# Patient Record
(demographics unavailable — no encounter records)

---

## 2024-10-11 NOTE — HISTORY OF PRESENT ILLNESS
[FreeTextEntry1] : 10/11/24 [facility] US Att'g Consult Note: Ms Cam is a [id number] at 19w5d who is referred for anatomic survey.   Her past Gyn Hx is significant for uterine myomas which were present in her first pregnancy but did not bother her.  Today she has no complaints.  ROS is negative.  Px: The abdomen is soft and NT. The uterus is palpated, and is nontender, including the LLQ.    Today's ultrasound findings: 1.  AN INTRAUTERINE RIVAS GESTATION IS NOTED in variable lie with posterior position. . 2.  ESTIMATED FETAL WEIGHT IS APPROPRIATE FOR THE STATED GESTATIONAL AGE. 3.  ADEQUATE AMNIOTIC FLUID VOLUME. 4.  NO MAJOR FETAL MALFORMATIONS ARE NOTED ON THE VISUALIZED ANATOMY TODAY WITHIN THE LIMITATIONS OF ULTRASOUND. 5.  Two myomas are noted: the largest is 9cm and is in the anterior left SACHIN.  The other is 4cm and is in the posterior left SACHIN.  Neither appears to be obstructing the birth canal.  6.  TRANSVAGINAL ULTRASOUND WAS required IN ADDITION TO A TRANSABDOMINAL TO ADEQUATELY IMAGE THE SACHIN, MYOMAS AND CERVIX.  THE CERVICAL LENGTH WAS NORMAL.   Impression: 1. Normal anatomic survey.  Ms Cam understands that ULTRASOUND CANNOT DETECT ALL FETAL ANOMALIES AND THAT SOME ANOMALIES CAN DEVELOP OVER TIME. 2. Large SACHIN myomas, not obstructing the birth canal.  These myomas will likely enlarge and move out of the pelvis as the uterus grows. 3. We discussed the possibility of myoma necrosis due to rapidly growing tumors.  This can cause significant pain and uterine contractions.    Recommend: 1.  Ms Cam understands to come to hospital if she experiences moderate to severe uterine pain for evaluation and treatment.  2.  RT for growth US BETWEEN 32 - 34 WEEKS. 3.  Weekly fetal testing starting at 37w for maternal age 36 at delivery.   Thank you for allowing us to be part of Ms Cam' prenatal care team.   MD Stephan, FACOG

## 2024-10-14 NOTE — HISTORY OF PRESENT ILLNESS
[FreeTextEntry1] : REASON FOR VISIT: Ms. Luly Cam is a 35-year-old  female of [place] descent with a gestational age of 19 weeks and 5 days based on an ADILENE of 3/2/2025 who was seen for genetic counseling through [facility] on 10/11/2024 to review her comprehensive carrier screening. The patient was accompanied by her partner.    RELEVANT MEDICAL AND SURGICAL HISTORY: - Anemia (on iron)   PREGNANCY HISTORY: Conception: Natural Bleeding/Spotting: Denied  Illness/Infection: Denied  Fever: Denied  Medications: Denied  Alcohol: Denied  Drugs/Smoking: Denied  Other: Denied    COUNSELING NARRATIVE: #Carrier of Genetic Disorder The patient had comprehensive carrier screening done through [facility] for a 274 gene Horizon panel and the results were as follows:   Positive Carrier: 1. Silent carrier of Alpha Thalassemia (aa/-a)   The clinical manifestations and inheritance pattern of this disorder was reviewed/these disorders were reviewed.  This patient was found on universal carrier screening to be a silent carrier of alpha thalassemia.  This patient was informed that there are four alpha thalassemia genes and that the loss of one of these genes is referred to as a "silent carrier" since most people with this result have a normal MCV value.  We reviewed the clinical manifestations and autosomal recessive inheritance of alpha thalassemia.  If one gene is missing, a person is a silent carrier of alpha thalassemia and usually has no signs or symptoms. If two genes are missing, a person has alpha thalassemia trait (also called alpha thalassemia minor) and may have mild anemia. If three genes are missing, a person has hemoglobin H disease which can cause moderate to severe anemia. If all four genes are missing, a person has alpha thalassemia major (also called hemoglobin Barts or hydrops fetalis) which is the most severe type of alpha thalassemia generally causing fetal death. Testing this patient's /partner for alpha thalassemia was recommended to assess for the risk of hemoglobin H disease for their children.  The patient's  stated he was uninsured. We discussed the lab offers the test at a cost of $250 with financial discounts. We also discussed a CBC to assess the MCV value. While the MCV value is helpful for alpha thalassemia trait, it cannot always detect silent carriers or tell us if the variants are in cis or trans, however, a normal MCV would significantly lower the likelihood of having children with hemoglobin H disease. The patient's  stated he had his blood drawn several years ago through a primary care doctor and was told everything was normal. He said the clinic was only a few blocks away from his home. He was encouraged to go to the clinic to obtain his bloodwork and send it to us for our review.  The remaining genes were negative and reported no pathogenic variants detected. We reviewed the reason for carrier screening and the clinical significance. This patient was informed that based on the current test results, this couples chance to have a child with any of the remaining disorders tested are greatly reduced.   The patient was informed about the importance of sharing her carrier status with her family, as relatives confer a risk of being a carrier for this disorder.   PRENATAL TESTING: We discussed the correlation between maternal age and Down syndrome, and the phenotype and natural history of Down syndrome and other common aneuploidies were reviewed. Because she will be 35 at delivery, her age-related risk for a baby with Down syndrome is 1 in 353 and her risk for a baby with any chromosomal abnormality, including Down syndrome, is 1 in 178.   The patient previously had cell-free fetal DNA screening performed for this pregnancy which was low risk for aneuploidies for the chromosomes studied (21, 18, 13, X, Y).     The patient was given the option of diagnostic testing.  The risks, benefits, and limitations of each option were explained including the 1/400 risk of miscarriage or other complication with invasive testing. Diagnostic testing also allows for microarray analysis. Unlike chorionic villus sampling (CVS) and amniocentesis which are diagnostic procedures, cell-free fetal DNA screening is a screening test with very high sensitivity and specificity for trisomies 21, 18, and 13. It can also detect sex chromosome aneuploidy and several microdeletion syndromes but with lower sensitivity and specificity. If cell-free fetal DNA screening is positive, ultrasound and diagnostic testing are recommended for confirmation.  If cell-free fetal DNA screening is negative, it is not a guarantee of an unaffected child.  She also has the option not to test.   The patient declined diagnostic testing at this time.   FAMILY HISTORY: A 3 generation pedigree was obtained and will be scanned into the medical record. Of note, we discussed the following significant family history:   The family history was otherwise negative for known significant birth defects, intellectual disability, consanguinity and known genetic diseases.  There is a 3-5% background risk for any birth defect or developmental issue with every pregnancy.  SUMMARY AND PLAN: - We reviewed the patient's comprehensive carrier screening results. - No further testing was ordered today.   The patient expressed understanding and all of her questions were answered in detail. If you have any additional questions, please feel free to call me at [phone] or [phone]. Thank you for referring this patient.   Sincerely,   Sherri Andrade MS, [facility] Genetic Counselor

## 2024-10-14 NOTE — HISTORY OF PRESENT ILLNESS
[FreeTextEntry1] : REASON FOR VISIT: Ms. Luly Cam is a 35-year-old  female of [place] descent with a gestational age of 19 weeks and 5 days based on an ADILENE of 3/2/2025 who was seen for genetic counseling through [facility] on 10/11/2024 to review her comprehensive carrier screening. The patient was accompanied by her partner.    RELEVANT MEDICAL AND SURGICAL HISTORY: - Anemia (on iron)   PREGNANCY HISTORY: Conception: Natural Bleeding/Spotting: Denied  Illness/Infection: Denied  Fever: Denied  Medications: Denied  Alcohol: Denied  Drugs/Smoking: Denied  Other: Denied    COUNSELING NARRATIVE: #Carrier of Genetic Disorder The patient had comprehensive carrier screening done through [facility] for a 274 gene Horizon panel and the results were as follows:   Positive Carrier: 1. Silent carrier of Alpha Thalassemia (aa/-a)   The clinical manifestations and inheritance pattern of this disorder was reviewed/these disorders were reviewed.  This patient was found on universal carrier screening to be a silent carrier of alpha thalassemia.  This patient was informed that there are four alpha thalassemia genes and that the loss of one of these genes is referred to as a "silent carrier" since most people with this result have a normal MCV value.  We reviewed the clinical manifestations and autosomal recessive inheritance of alpha thalassemia.  If one gene is missing, a person is a silent carrier of alpha thalassemia and usually has no signs or symptoms. If two genes are missing, a person has alpha thalassemia trait (also called alpha thalassemia minor) and may have mild anemia. If three genes are missing, a person has hemoglobin H disease which can cause moderate to severe anemia. If all four genes are missing, a person has alpha thalassemia major (also called hemoglobin Barts or hydrops fetalis) which is the most severe type of alpha thalassemia generally causing fetal death. Testing this patient's /partner for alpha thalassemia was recommended to assess for the risk of hemoglobin H disease for their children.  The patient's  stated he was uninsured. We discussed the lab offers the test at a cost of $250 with financial discounts. We also discussed a CBC to assess the MCV value. While the MCV value is helpful for alpha thalassemia trait, it cannot always detect silent carriers or tell us if the variants are in cis or trans, however, a normal MCV would significantly lower the likelihood of having children with hemoglobin H disease. The patient's  stated he had his blood drawn several years ago through a primary care doctor and was told everything was normal. He said the clinic was only a few blocks away from his home. He was encouraged to go to the clinic to obtain his bloodwork and send it to us for our review.  The remaining genes were negative and reported no pathogenic variants detected. We reviewed the reason for carrier screening and the clinical significance. This patient was informed that based on the current test results, this couples chance to have a child with any of the remaining disorders tested are greatly reduced.   The patient was informed about the importance of sharing her carrier status with her family, as relatives confer a risk of being a carrier for this disorder.   PRENATAL TESTING: We discussed the correlation between maternal age and Down syndrome, and the phenotype and natural history of Down syndrome and other common aneuploidies were reviewed. Because she will be 35 at delivery, her age-related risk for a baby with Down syndrome is 1 in 353 and her risk for a baby with any chromosomal abnormality, including Down syndrome, is 1 in 178.   The patient previously had cell-free fetal DNA screening performed for this pregnancy which was low risk for aneuploidies for the chromosomes studied (21, 18, 13, X, Y).     The patient was given the option of diagnostic testing.  The risks, benefits, and limitations of each option were explained including the 1/400 risk of miscarriage or other complication with invasive testing. Diagnostic testing also allows for microarray analysis. Unlike chorionic villus sampling (CVS) and amniocentesis which are diagnostic procedures, cell-free fetal DNA screening is a screening test with very high sensitivity and specificity for trisomies 21, 18, and 13. It can also detect sex chromosome aneuploidy and several microdeletion syndromes but with lower sensitivity and specificity. If cell-free fetal DNA screening is positive, ultrasound and diagnostic testing are recommended for confirmation.  If cell-free fetal DNA screening is negative, it is not a guarantee of an unaffected child.  She also has the option not to test.   The patient declined diagnostic testing at this time.   FAMILY HISTORY: A 3 generation pedigree was obtained and will be scanned into the medical record. Of note, we discussed the following significant family history:   The family history was otherwise negative for known significant birth defects, intellectual disability, consanguinity and known genetic diseases.  There is a 3-5% background risk for any birth defect or developmental issue with every pregnancy.  SUMMARY AND PLAN: - We reviewed the patient's comprehensive carrier screening results. - No further testing was ordered today.   The patient expressed understanding and all of her questions were answered in detail. If you have any additional questions, please feel free to call me at [phone] or [phone]. Thank you for referring this patient.   Sincerely,   Sherri Andrade MS, [facility] Genetic Counselor

## 2024-10-14 NOTE — HISTORY OF PRESENT ILLNESS
[FreeTextEntry1] : REASON FOR VISIT: Ms. Luly Cam is a 35-year-old  female of [place] descent with a gestational age of 19 weeks and 5 days based on an ADILENE of 3/2/2025 who was seen for genetic counseling through [facility] on 10/11/2024 to review her comprehensive carrier screening. The patient was accompanied by her partner.    RELEVANT MEDICAL AND SURGICAL HISTORY: - Anemia (on iron)   PREGNANCY HISTORY: Conception: Natural Bleeding/Spotting: Denied  Illness/Infection: Denied  Fever: Denied  Medications: Denied  Alcohol: Denied  Drugs/Smoking: Denied  Other: Denied    COUNSELING NARRATIVE: #Carrier of Genetic Disorder The patient had comprehensive carrier screening done through [employer] for a 274 gene Horizon panel and the results were as follows:   Positive Carrier: 1. Silent carrier of Alpha Thalassemia (aa/-a)   The clinical manifestations and inheritance pattern of this disorder was reviewed/these disorders were reviewed.  This patient was found on universal carrier screening to be a silent carrier of alpha thalassemia.  This patient was informed that there are four alpha thalassemia genes and that the loss of one of these genes is referred to as a "silent carrier" since most people with this result have a normal MCV value.  We reviewed the clinical manifestations and autosomal recessive inheritance of alpha thalassemia.  If one gene is missing, a person is a silent carrier of alpha thalassemia and usually has no signs or symptoms. If two genes are missing, a person has alpha thalassemia trait (also called alpha thalassemia minor) and may have mild anemia. If three genes are missing, a person has hemoglobin H disease which can cause moderate to severe anemia. If all four genes are missing, a person has alpha thalassemia major (also called hemoglobin Barts or hydrops fetalis) which is the most severe type of alpha thalassemia generally causing fetal death. Testing this patient's /partner for alpha thalassemia was recommended to assess for the risk of hemoglobin H disease for their children.  The patient's  stated he was uninsured. We discussed the lab offers the test at a cost of $250 with financial discounts. We also discussed a CBC to assess the MCV value. While the MCV value is helpful for alpha thalassemia trait, it cannot always detect silent carriers or tell us if the variants are in cis or trans, however, a normal MCV would significantly lower the likelihood of having children with hemoglobin H disease. The patient's  stated he had his blood drawn several years ago through a primary care doctor and was told everything was normal. He said the clinic was only a few blocks away from his home. He was encouraged to go to the clinic to obtain his bloodwork and send it to us for our review.  The remaining genes were negative and reported no pathogenic variants detected. We reviewed the reason for carrier screening and the clinical significance. This patient was informed that based on the current test results, this couples chance to have a child with any of the remaining disorders tested are greatly reduced.   The patient was informed about the importance of sharing her carrier status with her family, as relatives confer a risk of being a carrier for this disorder.   PRENATAL TESTING: We discussed the correlation between maternal age and Down syndrome, and the phenotype and natural history of Down syndrome and other common aneuploidies were reviewed. Because she will be 35 at delivery, her age-related risk for a baby with Down syndrome is 1 in 353 and her risk for a baby with any chromosomal abnormality, including Down syndrome, is 1 in 178.   The patient previously had cell-free fetal DNA screening performed for this pregnancy which was low risk for aneuploidies for the chromosomes studied (21, 18, 13, X, Y).     The patient was given the option of diagnostic testing.  The risks, benefits, and limitations of each option were explained including the 1/400 risk of miscarriage or other complication with invasive testing. Diagnostic testing also allows for microarray analysis. Unlike chorionic villus sampling (CVS) and amniocentesis which are diagnostic procedures, cell-free fetal DNA screening is a screening test with very high sensitivity and specificity for trisomies 21, 18, and 13. It can also detect sex chromosome aneuploidy and several microdeletion syndromes but with lower sensitivity and specificity. If cell-free fetal DNA screening is positive, ultrasound and diagnostic testing are recommended for confirmation.  If cell-free fetal DNA screening is negative, it is not a guarantee of an unaffected child.  She also has the option not to test.   The patient declined diagnostic testing at this time.   FAMILY HISTORY: A 3 generation pedigree was obtained and will be scanned into the medical record. Of note, we discussed the following significant family history:   The family history was otherwise negative for known significant birth defects, intellectual disability, consanguinity and known genetic diseases.  There is a 3-5% background risk for any birth defect or developmental issue with every pregnancy.  SUMMARY AND PLAN: - We reviewed the patient's comprehensive carrier screening results. - No further testing was ordered today.   The patient expressed understanding and all of her questions were answered in detail. If you have any additional questions, please feel free to call me at [phone] or [phone]. Thank you for referring this patient.   Sincerely,   Sherri Andrade MS, [facility] Genetic Counselor